# Patient Record
Sex: MALE | Race: WHITE | Employment: OTHER | URBAN - METROPOLITAN AREA
[De-identification: names, ages, dates, MRNs, and addresses within clinical notes are randomized per-mention and may not be internally consistent; named-entity substitution may affect disease eponyms.]

---

## 2023-02-01 RX ORDER — ALBUTEROL SULFATE 90 UG/1
2 AEROSOL, METERED RESPIRATORY (INHALATION)
COMMUNITY

## 2023-02-01 RX ORDER — AMLODIPINE BESYLATE 5 MG/1
5 TABLET ORAL DAILY
COMMUNITY

## 2023-05-23 LAB
ALANINE AMINOTRANSFERASE (SGPT) (U/L) IN SER/PLAS: 11 U/L (ref 10–52)
ALBUMIN (G/DL) IN SER/PLAS: 4.7 G/DL (ref 3.4–5)
ALKALINE PHOSPHATASE (U/L) IN SER/PLAS: 67 U/L (ref 33–136)
ASPARTATE AMINOTRANSFERASE (SGOT) (U/L) IN SER/PLAS: 17 U/L (ref 9–39)
BILIRUBIN DIRECT (MG/DL) IN SER/PLAS: 0.1 MG/DL (ref 0–0.3)
BILIRUBIN TOTAL (MG/DL) IN SER/PLAS: 0.9 MG/DL (ref 0–1.2)
CHOLESTEROL (MG/DL) IN SER/PLAS: 112 MG/DL (ref 0–199)
CHOLESTEROL IN HDL (MG/DL) IN SER/PLAS: 40.6 MG/DL
CHOLESTEROL/HDL RATIO: 2.8
LDL: 52 MG/DL (ref 0–99)
PROTEIN TOTAL: 7.3 G/DL (ref 6.4–8.2)
TRIGLYCERIDE (MG/DL) IN SER/PLAS: 99 MG/DL (ref 0–149)
VLDL: 20 MG/DL (ref 0–40)

## 2023-09-07 PROBLEM — Z91.89 AT RISK FOR BLEEDING: Status: ACTIVE | Noted: 2023-09-07

## 2023-09-07 PROBLEM — J45.909 ASTHMA (HHS-HCC): Status: ACTIVE | Noted: 2023-09-07

## 2023-09-07 PROBLEM — E78.5 HYPERLIPIDEMIA: Status: ACTIVE | Noted: 2023-09-07

## 2023-09-07 PROBLEM — K92.2 LOWER GASTROINTESTINAL HEMORRHAGE: Status: RESOLVED | Noted: 2023-09-07 | Resolved: 2023-09-07

## 2023-09-07 PROBLEM — I10 HYPERTENSION: Status: ACTIVE | Noted: 2023-09-07

## 2023-09-07 PROBLEM — K57.90 DIVERTICULOSIS: Status: ACTIVE | Noted: 2023-09-07

## 2023-09-07 PROBLEM — C61 CARCINOMA OF PROSTATE (MULTI): Status: ACTIVE | Noted: 2023-09-07

## 2023-09-07 PROBLEM — R73.03 PREDIABETES: Status: ACTIVE | Noted: 2023-09-07

## 2023-09-07 PROBLEM — I82.90 VENOUS THROMBOSIS: Status: ACTIVE | Noted: 2023-09-07

## 2023-09-07 PROBLEM — K62.5 RECTAL HEMORRHAGE: Status: RESOLVED | Noted: 2023-09-07 | Resolved: 2023-09-07

## 2023-09-07 PROBLEM — K57.30 DIVERTICULAR DISEASE OF COLON: Status: ACTIVE | Noted: 2023-09-07

## 2023-09-07 PROBLEM — I25.10 CORONARY ARTERIOSCLEROSIS: Status: ACTIVE | Noted: 2023-09-07

## 2023-09-07 PROBLEM — E78.6 LIPOPROTEIN DEFICIENCY DISORDER: Status: ACTIVE | Noted: 2023-09-07

## 2023-09-07 PROBLEM — F40.243 FEAR OF FLYING: Status: ACTIVE | Noted: 2023-09-07

## 2023-09-07 RX ORDER — MAGNESIUM 200 MG
2 TABLET ORAL NIGHTLY
COMMUNITY

## 2023-09-07 RX ORDER — MULTIVIT-MINS/IRON/FOLIC/LYCOP 8-200-600
1 TABLET ORAL DAILY
COMMUNITY

## 2023-09-07 RX ORDER — LORAZEPAM 0.5 MG/1
0.5 TABLET ORAL SEE ADMIN INSTRUCTIONS
COMMUNITY
Start: 2023-03-27 | End: 2023-11-13 | Stop reason: SDUPTHER

## 2023-09-07 RX ORDER — ROSUVASTATIN CALCIUM 20 MG/1
1 TABLET, COATED ORAL DAILY
COMMUNITY
End: 2024-03-01

## 2023-09-07 RX ORDER — UBIDECARENONE 50 MG
2 CAPSULE ORAL DAILY
COMMUNITY
End: 2024-01-29 | Stop reason: ALTCHOICE

## 2023-09-07 RX ORDER — LECITHIN 1200 MG
1600 CAPSULE ORAL DAILY
COMMUNITY

## 2023-09-07 RX ORDER — ALBUTEROL SULFATE 0.83 MG/ML
3 SOLUTION RESPIRATORY (INHALATION) 4 TIMES DAILY
COMMUNITY

## 2023-09-07 RX ORDER — LANOLIN ALCOHOL/MO/W.PET/CERES
1 CREAM (GRAM) TOPICAL DAILY
COMMUNITY
End: 2024-01-29 | Stop reason: ALTCHOICE

## 2023-09-07 RX ORDER — AMLODIPINE BESYLATE 10 MG/1
1 TABLET ORAL DAILY
COMMUNITY
End: 2024-03-15

## 2023-09-07 RX ORDER — TRAMADOL HYDROCHLORIDE 50 MG/1
1 TABLET ORAL EVERY 4 HOURS PRN
COMMUNITY
End: 2024-01-29 | Stop reason: ALTCHOICE

## 2023-09-07 RX ORDER — ASPIRIN 325 MG
1 TABLET, DELAYED RELEASE (ENTERIC COATED) ORAL DAILY
COMMUNITY

## 2023-09-07 RX ORDER — CHOLECALCIFEROL (VITAMIN D3) 125 MCG
1 CAPSULE ORAL DAILY
COMMUNITY
End: 2024-01-29 | Stop reason: ALTCHOICE

## 2023-09-07 RX ORDER — HYOSCYAMINE SULFATE 0.125 MG
1 TABLET ORAL EVERY 4 HOURS PRN
COMMUNITY

## 2023-11-13 ENCOUNTER — TELEPHONE (OUTPATIENT)
Dept: PRIMARY CARE | Facility: CLINIC | Age: 63
End: 2023-11-13
Payer: MEDICARE

## 2023-11-13 DIAGNOSIS — F40.243 FEAR OF FLYING: Primary | ICD-10-CM

## 2023-11-13 RX ORDER — LORAZEPAM 0.5 MG/1
0.25 TABLET ORAL SEE ADMIN INSTRUCTIONS
Qty: 3 TABLET | Refills: 0 | Status: SHIPPED | OUTPATIENT
Start: 2023-11-13 | End: 2023-11-20 | Stop reason: SDUPTHER

## 2023-11-13 NOTE — TELEPHONE ENCOUNTER
Dejuan called to see about getting a refill on his Lorazepam 0.5 mg. He mentioned that he is flying in 2 weeks. He is asking for it to be sent to [facility], [place].

## 2023-11-20 ENCOUNTER — TELEPHONE (OUTPATIENT)
Dept: PRIMARY CARE | Facility: CLINIC | Age: 63
End: 2023-11-20
Payer: MEDICARE

## 2023-11-20 DIAGNOSIS — F40.243 FEAR OF FLYING: ICD-10-CM

## 2023-11-20 RX ORDER — LORAZEPAM 0.5 MG/1
0.5 TABLET ORAL DAILY PRN
Qty: 4 TABLET | Refills: 0 | Status: SHIPPED | OUTPATIENT
Start: 2023-11-20

## 2023-11-20 NOTE — TELEPHONE ENCOUNTER
Patient called very upset and said he has been waiting for this refill for a week. He called on 11/13, 11/17 and today. Please refill today, otherwise he said he will be here in person tomorrow to request a paper RX.

## 2024-01-29 ENCOUNTER — OFFICE VISIT (OUTPATIENT)
Dept: PRIMARY CARE | Facility: CLINIC | Age: 64
End: 2024-01-29
Payer: MEDICARE

## 2024-01-29 VITALS
HEART RATE: 60 BPM | TEMPERATURE: 98.9 F | OXYGEN SATURATION: 96 % | DIASTOLIC BLOOD PRESSURE: 82 MMHG | HEIGHT: 69 IN | SYSTOLIC BLOOD PRESSURE: 132 MMHG | WEIGHT: 200.2 LBS | BODY MASS INDEX: 29.65 KG/M2

## 2024-01-29 DIAGNOSIS — I10 PRIMARY HYPERTENSION: ICD-10-CM

## 2024-01-29 DIAGNOSIS — E78.2 MIXED HYPERLIPIDEMIA: ICD-10-CM

## 2024-01-29 DIAGNOSIS — R73.03 PREDIABETES: ICD-10-CM

## 2024-01-29 DIAGNOSIS — C61 CARCINOMA OF PROSTATE (MULTI): ICD-10-CM

## 2024-01-29 DIAGNOSIS — Z00.00 ROUTINE GENERAL MEDICAL EXAMINATION AT HEALTH CARE FACILITY: Primary | ICD-10-CM

## 2024-01-29 PROBLEM — E78.6 LIPOPROTEIN DEFICIENCY DISORDER: Chronic | Status: ACTIVE | Noted: 2023-09-07

## 2024-01-29 PROBLEM — K57.90 DIVERTICULOSIS: Status: RESOLVED | Noted: 2023-09-07 | Resolved: 2024-01-29

## 2024-01-29 PROBLEM — Z91.89 AT RISK FOR BLEEDING: Status: RESOLVED | Noted: 2023-09-07 | Resolved: 2024-01-29

## 2024-01-29 PROBLEM — F40.243 FEAR OF FLYING: Chronic | Status: ACTIVE | Noted: 2023-09-07

## 2024-01-29 PROBLEM — E78.5 HYPERLIPIDEMIA: Chronic | Status: ACTIVE | Noted: 2023-09-07

## 2024-01-29 PROCEDURE — 99215 OFFICE O/P EST HI 40 MIN: CPT | Performed by: FAMILY MEDICINE

## 2024-01-29 PROCEDURE — 99214 OFFICE O/P EST MOD 30 MIN: CPT | Performed by: FAMILY MEDICINE

## 2024-01-29 PROCEDURE — 1170F FXNL STATUS ASSESSED: CPT | Performed by: FAMILY MEDICINE

## 2024-01-29 PROCEDURE — 1160F RVW MEDS BY RX/DR IN RCRD: CPT | Performed by: FAMILY MEDICINE

## 2024-01-29 PROCEDURE — 3075F SYST BP GE 130 - 139MM HG: CPT | Performed by: FAMILY MEDICINE

## 2024-01-29 PROCEDURE — 1159F MED LIST DOCD IN RCRD: CPT | Performed by: FAMILY MEDICINE

## 2024-01-29 PROCEDURE — 3079F DIAST BP 80-89 MM HG: CPT | Performed by: FAMILY MEDICINE

## 2024-01-29 PROCEDURE — 1036F TOBACCO NON-USER: CPT | Performed by: FAMILY MEDICINE

## 2024-01-29 PROCEDURE — G0439 PPPS, SUBSEQ VISIT: HCPCS | Performed by: FAMILY MEDICINE

## 2024-01-29 PROCEDURE — 1126F AMNT PAIN NOTED NONE PRSNT: CPT | Performed by: FAMILY MEDICINE

## 2024-01-29 ASSESSMENT — ENCOUNTER SYMPTOMS
DEPRESSION: 0
CONSTITUTIONAL NEGATIVE: 1
ENDOCRINE NEGATIVE: 1
ALLERGIC/IMMUNOLOGIC NEGATIVE: 1
RESPIRATORY NEGATIVE: 1
OCCASIONAL FEELINGS OF UNSTEADINESS: 0
MUSCULOSKELETAL NEGATIVE: 1
PSYCHIATRIC NEGATIVE: 1
LOSS OF SENSATION IN FEET: 0
GASTROINTESTINAL NEGATIVE: 1
EYES NEGATIVE: 1
NEUROLOGICAL NEGATIVE: 1

## 2024-01-29 ASSESSMENT — LIFESTYLE VARIABLES
AUDIT-C TOTAL SCORE: 3
HOW MANY STANDARD DRINKS CONTAINING ALCOHOL DO YOU HAVE ON A TYPICAL DAY: 1 OR 2
SKIP TO QUESTIONS 9-10: 1
HOW OFTEN DO YOU HAVE A DRINK CONTAINING ALCOHOL: 2-3 TIMES A WEEK
HOW OFTEN DO YOU HAVE SIX OR MORE DRINKS ON ONE OCCASION: NEVER

## 2024-01-29 ASSESSMENT — PAIN SCALES - GENERAL: PAINLEVEL: 0-NO PAIN

## 2024-01-29 ASSESSMENT — ACTIVITIES OF DAILY LIVING (ADL)
JUDGMENT_ADEQUATE_SAFELY_COMPLETE_DAILY_ACTIVITIES: YES
GROCERY_SHOPPING: INDEPENDENT
ADEQUATE_TO_COMPLETE_ADL: YES
MANAGING_FINANCES: INDEPENDENT
DRESSING: INDEPENDENT
BATHING: INDEPENDENT
DOING_HOUSEWORK: INDEPENDENT
FEEDING: INDEPENDENT
TAKING_MEDICATION: INDEPENDENT
DRESSING: INDEPENDENT
TOILETING: INDEPENDENT
BATHING: INDEPENDENT

## 2024-01-29 ASSESSMENT — PATIENT HEALTH QUESTIONNAIRE - PHQ9
2. FEELING DOWN, DEPRESSED OR HOPELESS: NOT AT ALL
SUM OF ALL RESPONSES TO PHQ9 QUESTIONS 1 AND 2: 0
1. LITTLE INTEREST OR PLEASURE IN DOING THINGS: NOT AT ALL

## 2024-01-29 NOTE — PROGRESS NOTES
Subjective   Reason for Visit: Rainer Fajardo is an 63 y.o. male here for a Medicare Wellness visit.     Past Medical, Surgical, and Family History reviewed and updated in chart.    Reviewed all medications by prescribing practitioner or clinical pharmacist (such as prescriptions, OTCs, herbal therapies and supplements) and documented in the medical record.    HPI  Hypertension:          c/o Patient here for F/U. at goal, stable.          c/o Med compliance.          Denies : Med side effects.   Asthma:          c/o Triggers with allergies.          Denies : Shortness of breath.          Denies : Cough.          Asthma F/U doing well and without complaints, at patient's baseline, stable off meds, uses proair prn.   Prostate Cancer:          c/o Difficulty voiding some leakage with urine, sees allyson,  salvage radiation due to rising psa , psa undectable,  sees radiation onc for psa checks          c/o Erectile dysfunction occ.          Denies : Pain.          Denies : Appetite change.          Denies : Fatigue.          Denies : Bone pain.   Diabetes Mellitus:          Insulin Resistance diagnosed with pre diabetes, follow fasting blood sugars periodically, counseled on weight loss and regular exercise.   Hyperlipidemia:          c/o Patient here for F/U. stable, currently trying to control with therapeutic lifestyle changes, on rosuvastatin   Patient Care Team:  Ed Pressley MD as PCP - General (Family Medicine)  Damon Caruso MD as Referring Physician (Urology)  Jacob Perez MD as Consulting Physician (Radiation Oncology)     Review of Systems   Constitutional: Negative.    HENT: Negative.     Eyes: Negative.    Respiratory: Negative.     Gastrointestinal: Negative.    Endocrine: Negative.    Genitourinary: Negative.    Musculoskeletal: Negative.    Skin: Negative.    Allergic/Immunologic: Negative.    Neurological: Negative.    Psychiatric/Behavioral: Negative.         Objective   Vitals:  BP  "132/82   Pulse 60   Temp 37.2 °C (98.9 °F) (Temporal)   Ht 1.74 m (5' 8.5\")   Wt 90.8 kg (200 lb 3.2 oz)   SpO2 96%   BMI 30.00 kg/m²       Physical Exam  Vitals reviewed.   Constitutional:       Appearance: Normal appearance.   HENT:      Right Ear: Tympanic membrane, ear canal and external ear normal.      Left Ear: Tympanic membrane, ear canal and external ear normal.      Nose: Nose normal.      Mouth/Throat:      Mouth: Mucous membranes are moist.   Eyes:      Extraocular Movements: Extraocular movements intact.      Conjunctiva/sclera: Conjunctivae normal.      Pupils: Pupils are equal, round, and reactive to light.   Cardiovascular:      Rate and Rhythm: Normal rate and regular rhythm.      Pulses: Normal pulses.      Heart sounds: Normal heart sounds.   Pulmonary:      Breath sounds: Normal breath sounds.   Abdominal:      General: Abdomen is flat. Bowel sounds are normal.      Palpations: Abdomen is soft.   Musculoskeletal:         General: Normal range of motion.      Cervical back: Neck supple.   Skin:     General: Skin is warm.   Neurological:      General: No focal deficit present.      Mental Status: He is alert and oriented to person, place, and time.      Cranial Nerves: Cranial nerves 2-12 are intact.   Psychiatric:         Attention and Perception: Attention normal.         Mood and Affect: Mood normal.         Assessment/Plan   Problem List Items Addressed This Visit       Carcinoma of prostate (CMS/HCC)    Hyperlipidemia    Relevant Orders    Urinalysis with Reflex Culture and Microscopic    Comprehensive Metabolic Panel    CBC and Auto Differential    TSH with reflex to Free T4 if abnormal    Lipid Panel    Hypertension    Relevant Orders    Urinalysis with Reflex Culture and Microscopic    Comprehensive Metabolic Panel    CBC and Auto Differential    TSH with reflex to Free T4 if abnormal    Lipid Panel    Prediabetes    Relevant Orders    Hemoglobin A1C     Other Visit Diagnoses       " Routine general medical examination at health care facility    -  Primary    Relevant Orders    1 Year Follow Up In Primary Care - Wellness Exam          Rto 6mo  Cont meds

## 2024-02-05 ENCOUNTER — LAB (OUTPATIENT)
Dept: LAB | Facility: LAB | Age: 64
End: 2024-02-05
Payer: MEDICARE

## 2024-02-05 DIAGNOSIS — E78.2 MIXED HYPERLIPIDEMIA: ICD-10-CM

## 2024-02-05 DIAGNOSIS — R73.03 PREDIABETES: ICD-10-CM

## 2024-02-05 DIAGNOSIS — I10 PRIMARY HYPERTENSION: ICD-10-CM

## 2024-02-05 LAB
ALBUMIN SERPL BCP-MCNC: 4.6 G/DL (ref 3.4–5)
ALP SERPL-CCNC: 64 U/L (ref 33–136)
ALT SERPL W P-5'-P-CCNC: 14 U/L (ref 10–52)
ANION GAP SERPL CALC-SCNC: 13 MMOL/L (ref 10–20)
APPEARANCE UR: CLEAR
AST SERPL W P-5'-P-CCNC: 18 U/L (ref 9–39)
BASOPHILS # BLD AUTO: 0.08 X10*3/UL (ref 0–0.1)
BASOPHILS NFR BLD AUTO: 1.3 %
BILIRUB SERPL-MCNC: 0.8 MG/DL (ref 0–1.2)
BILIRUB UR STRIP.AUTO-MCNC: NEGATIVE MG/DL
BUN SERPL-MCNC: 16 MG/DL (ref 6–23)
CALCIUM SERPL-MCNC: 9.6 MG/DL (ref 8.6–10.3)
CHLORIDE SERPL-SCNC: 105 MMOL/L (ref 98–107)
CHOLEST SERPL-MCNC: 121 MG/DL (ref 0–199)
CHOLESTEROL/HDL RATIO: 2.9
CO2 SERPL-SCNC: 27 MMOL/L (ref 21–32)
COLOR UR: YELLOW
CREAT SERPL-MCNC: 0.99 MG/DL (ref 0.5–1.3)
EGFRCR SERPLBLD CKD-EPI 2021: 85 ML/MIN/1.73M*2
EOSINOPHIL # BLD AUTO: 0.15 X10*3/UL (ref 0–0.7)
EOSINOPHIL NFR BLD AUTO: 2.5 %
ERYTHROCYTE [DISTWIDTH] IN BLOOD BY AUTOMATED COUNT: 12 % (ref 11.5–14.5)
EST. AVERAGE GLUCOSE BLD GHB EST-MCNC: 108 MG/DL
GLUCOSE SERPL-MCNC: 95 MG/DL (ref 74–99)
GLUCOSE UR STRIP.AUTO-MCNC: NEGATIVE MG/DL
HBA1C MFR BLD: 5.4 %
HCT VFR BLD AUTO: 46.6 % (ref 41–52)
HDLC SERPL-MCNC: 42.4 MG/DL
HGB BLD-MCNC: 15.6 G/DL (ref 13.5–17.5)
HOLD SPECIMEN: NORMAL
IMM GRANULOCYTES # BLD AUTO: 0.03 X10*3/UL (ref 0–0.7)
IMM GRANULOCYTES NFR BLD AUTO: 0.5 % (ref 0–0.9)
KETONES UR STRIP.AUTO-MCNC: NEGATIVE MG/DL
LDLC SERPL CALC-MCNC: 59 MG/DL
LEUKOCYTE ESTERASE UR QL STRIP.AUTO: NEGATIVE
LYMPHOCYTES # BLD AUTO: 1.49 X10*3/UL (ref 1.2–4.8)
LYMPHOCYTES NFR BLD AUTO: 24.5 %
MCH RBC QN AUTO: 31.9 PG (ref 26–34)
MCHC RBC AUTO-ENTMCNC: 33.5 G/DL (ref 32–36)
MCV RBC AUTO: 95 FL (ref 80–100)
MONOCYTES # BLD AUTO: 0.48 X10*3/UL (ref 0.1–1)
MONOCYTES NFR BLD AUTO: 7.9 %
NEUTROPHILS # BLD AUTO: 3.85 X10*3/UL (ref 1.2–7.7)
NEUTROPHILS NFR BLD AUTO: 63.3 %
NITRITE UR QL STRIP.AUTO: NEGATIVE
NON HDL CHOLESTEROL: 79 MG/DL (ref 0–149)
NRBC BLD-RTO: 0 /100 WBCS (ref 0–0)
PH UR STRIP.AUTO: 5 [PH]
PLATELET # BLD AUTO: 181 X10*3/UL (ref 150–450)
POTASSIUM SERPL-SCNC: 4.5 MMOL/L (ref 3.5–5.3)
PROT SERPL-MCNC: 7.2 G/DL (ref 6.4–8.2)
PROT UR STRIP.AUTO-MCNC: NEGATIVE MG/DL
RBC # BLD AUTO: 4.89 X10*6/UL (ref 4.5–5.9)
RBC # UR STRIP.AUTO: NEGATIVE /UL
SODIUM SERPL-SCNC: 140 MMOL/L (ref 136–145)
SP GR UR STRIP.AUTO: 1.02
TRIGL SERPL-MCNC: 98 MG/DL (ref 0–149)
TSH SERPL-ACNC: 1.87 MIU/L (ref 0.44–3.98)
UROBILINOGEN UR STRIP.AUTO-MCNC: <2 MG/DL
VLDL: 20 MG/DL (ref 0–40)
WBC # BLD AUTO: 6.1 X10*3/UL (ref 4.4–11.3)

## 2024-02-05 PROCEDURE — 80053 COMPREHEN METABOLIC PANEL: CPT

## 2024-02-05 PROCEDURE — 36415 COLL VENOUS BLD VENIPUNCTURE: CPT

## 2024-02-05 PROCEDURE — 80061 LIPID PANEL: CPT

## 2024-02-05 PROCEDURE — 85025 COMPLETE CBC W/AUTO DIFF WBC: CPT

## 2024-02-05 PROCEDURE — 83036 HEMOGLOBIN GLYCOSYLATED A1C: CPT

## 2024-02-05 PROCEDURE — 81003 URINALYSIS AUTO W/O SCOPE: CPT

## 2024-02-05 PROCEDURE — 84443 ASSAY THYROID STIM HORMONE: CPT

## 2024-02-16 ENCOUNTER — LAB (OUTPATIENT)
Dept: LAB | Facility: CLINIC | Age: 64
End: 2024-02-16
Payer: MEDICARE

## 2024-02-16 DIAGNOSIS — C61 CARCINOMA OF PROSTATE (MULTI): Chronic | ICD-10-CM

## 2024-02-16 LAB — PSA SERPL-MCNC: <0.1 NG/ML

## 2024-02-16 PROCEDURE — 36415 COLL VENOUS BLD VENIPUNCTURE: CPT

## 2024-02-16 PROCEDURE — 84153 ASSAY OF PSA TOTAL: CPT | Mod: WESLAB | Performed by: RADIOLOGY

## 2024-03-01 ENCOUNTER — TELEPHONE (OUTPATIENT)
Dept: RADIATION ONCOLOGY | Facility: HOSPITAL | Age: 64
End: 2024-03-01
Payer: MEDICARE

## 2024-03-01 DIAGNOSIS — E78.2 MIXED HYPERLIPIDEMIA: Primary | Chronic | ICD-10-CM

## 2024-03-01 RX ORDER — ROSUVASTATIN CALCIUM 20 MG/1
20 TABLET, COATED ORAL DAILY
Qty: 90 TABLET | Refills: 3 | Status: SHIPPED | OUTPATIENT
Start: 2024-03-01

## 2024-03-15 DIAGNOSIS — I10 ESSENTIAL (PRIMARY) HYPERTENSION: ICD-10-CM

## 2024-03-15 RX ORDER — AMLODIPINE BESYLATE 10 MG/1
10 TABLET ORAL DAILY
Qty: 90 TABLET | Refills: 1 | Status: SHIPPED | OUTPATIENT
Start: 2024-03-15

## 2024-03-25 ENCOUNTER — TELEPHONE (OUTPATIENT)
Dept: PRIMARY CARE | Facility: CLINIC | Age: 64
End: 2024-03-25

## 2024-03-25 NOTE — TELEPHONE ENCOUNTER
Patient's wife calling because patient's 01/29/24 visit was coded as a CPE but should have been coded as a medicare wellness, needs to be changed and resubmitted to medicare so they will approve the claim

## 2024-07-30 ENCOUNTER — OFFICE VISIT (OUTPATIENT)
Dept: PRIMARY CARE | Facility: CLINIC | Age: 64
End: 2024-07-30
Payer: MEDICARE

## 2024-07-30 VITALS
OXYGEN SATURATION: 98 % | HEART RATE: 59 BPM | BODY MASS INDEX: 29.8 KG/M2 | WEIGHT: 201.2 LBS | TEMPERATURE: 98.6 F | SYSTOLIC BLOOD PRESSURE: 138 MMHG | DIASTOLIC BLOOD PRESSURE: 79 MMHG | HEIGHT: 69 IN

## 2024-07-30 DIAGNOSIS — C61 CARCINOMA OF PROSTATE (MULTI): Chronic | ICD-10-CM

## 2024-07-30 DIAGNOSIS — I10 PRIMARY HYPERTENSION: Primary | Chronic | ICD-10-CM

## 2024-07-30 DIAGNOSIS — J45.30 MILD PERSISTENT ASTHMA WITHOUT COMPLICATION (HHS-HCC): ICD-10-CM

## 2024-07-30 DIAGNOSIS — E78.2 MIXED HYPERLIPIDEMIA: Chronic | ICD-10-CM

## 2024-07-30 DIAGNOSIS — R73.03 PREDIABETES: Chronic | ICD-10-CM

## 2024-07-30 PROCEDURE — 3008F BODY MASS INDEX DOCD: CPT | Performed by: FAMILY MEDICINE

## 2024-07-30 PROCEDURE — 1124F ACP DISCUSS-NO DSCNMKR DOCD: CPT | Performed by: FAMILY MEDICINE

## 2024-07-30 PROCEDURE — 1126F AMNT PAIN NOTED NONE PRSNT: CPT | Performed by: FAMILY MEDICINE

## 2024-07-30 PROCEDURE — 1036F TOBACCO NON-USER: CPT | Performed by: FAMILY MEDICINE

## 2024-07-30 PROCEDURE — G2211 COMPLEX E/M VISIT ADD ON: HCPCS | Performed by: FAMILY MEDICINE

## 2024-07-30 PROCEDURE — 3075F SYST BP GE 130 - 139MM HG: CPT | Performed by: FAMILY MEDICINE

## 2024-07-30 PROCEDURE — 99214 OFFICE O/P EST MOD 30 MIN: CPT | Performed by: FAMILY MEDICINE

## 2024-07-30 PROCEDURE — 3078F DIAST BP <80 MM HG: CPT | Performed by: FAMILY MEDICINE

## 2024-07-30 PROCEDURE — 1160F RVW MEDS BY RX/DR IN RCRD: CPT | Performed by: FAMILY MEDICINE

## 2024-07-30 PROCEDURE — 1159F MED LIST DOCD IN RCRD: CPT | Performed by: FAMILY MEDICINE

## 2024-07-30 RX ORDER — EPINEPHRINE 0.22MG
100 AEROSOL WITH ADAPTER (ML) INHALATION DAILY
COMMUNITY

## 2024-07-30 ASSESSMENT — COLUMBIA-SUICIDE SEVERITY RATING SCALE - C-SSRS
1. IN THE PAST MONTH, HAVE YOU WISHED YOU WERE DEAD OR WISHED YOU COULD GO TO SLEEP AND NOT WAKE UP?: NO
2. HAVE YOU ACTUALLY HAD ANY THOUGHTS OF KILLING YOURSELF?: NO
6. HAVE YOU EVER DONE ANYTHING, STARTED TO DO ANYTHING, OR PREPARED TO DO ANYTHING TO END YOUR LIFE?: NO

## 2024-07-30 ASSESSMENT — PAIN SCALES - GENERAL: PAINLEVEL: 0-NO PAIN

## 2024-07-30 ASSESSMENT — ENCOUNTER SYMPTOMS
COUGH: 0
SHORTNESS OF BREATH: 0
OCCASIONAL FEELINGS OF UNSTEADINESS: 0
PALPITATIONS: 0
ABDOMINAL DISTENTION: 0
LOSS OF SENSATION IN FEET: 0
DEPRESSION: 0

## 2024-07-30 NOTE — PROGRESS NOTES
"Subjective   Patient ID: Rainer Fajardo is a 64 y.o. male who presents for Hypertension (Follow up) and blood work orders.    HPI   Hypertension:          c/o Patient here for F/U. at goal, stable.          c/o Med compliance.          Denies : Med side effects.   Home bp 130's/70's  Asthma:          c/o Triggers with allergies.          Denies : Shortness of breath.          Denies : Cough.          Asthma F/U doing well and without complaints, at patient's baseline, stable off meds, uses proair prn.   Prostate Cancer:          c/o Difficulty voiding some leakage with urine, sees allyson,  salvage radiation due to rising psa , psa undectable,  sees radiation onc for psa checks          c/o Erectile dysfunction occ.          Denies : Pain.          Denies : Appetite change.          Denies : Fatigue.          Denies : Bone pain.   Diabetes Mellitus:          Insulin Resistance diagnosed with pre diabetes, follow fasting blood sugars periodically, counseled on weight loss and regular exercise.   Hyperlipidemia:          c/o Patient here for F/U. stable,  on rosuvastatin   Review of Systems   Respiratory:  Negative for cough and shortness of breath.    Cardiovascular:  Negative for chest pain and palpitations.   Gastrointestinal:  Negative for abdominal distention.       Objective   /79   Pulse 59   Temp 37 °C (98.6 °F)   Ht 1.753 m (5' 9\")   Wt 91.3 kg (201 lb 3.2 oz)   SpO2 98%   BMI 29.71 kg/m²     Physical Exam  Vitals reviewed.   Constitutional:       Appearance: Normal appearance.   Cardiovascular:      Rate and Rhythm: Normal rate and regular rhythm.      Heart sounds: Normal heart sounds. No murmur heard.  Pulmonary:      Breath sounds: Normal breath sounds.   Abdominal:      General: Abdomen is flat. Bowel sounds are normal.      Palpations: Abdomen is soft.   Musculoskeletal:         General: No swelling.   Neurological:      Mental Status: He is alert.         Assessment/Plan   Diagnoses and " all orders for this visit:  Primary hypertension  -     Comprehensive Metabolic Panel; Future  Mixed hyperlipidemia  Prediabetes  Carcinoma of prostate (Multi)  Mild persistent asthma without complication (HHS-HCC)  Other orders  -     3 Month Follow Up In Primary Care

## 2024-08-08 ENCOUNTER — LAB (OUTPATIENT)
Dept: LAB | Facility: LAB | Age: 64
End: 2024-08-08
Payer: MEDICARE

## 2024-08-08 DIAGNOSIS — I10 PRIMARY HYPERTENSION: Chronic | ICD-10-CM

## 2024-08-08 LAB
ALBUMIN SERPL BCP-MCNC: 5 G/DL (ref 3.4–5)
ALP SERPL-CCNC: 65 U/L (ref 33–136)
ALT SERPL W P-5'-P-CCNC: 14 U/L (ref 10–52)
ANION GAP SERPL CALC-SCNC: 13 MMOL/L (ref 10–20)
AST SERPL W P-5'-P-CCNC: 21 U/L (ref 9–39)
BILIRUB SERPL-MCNC: 0.6 MG/DL (ref 0–1.2)
BUN SERPL-MCNC: 19 MG/DL (ref 6–23)
CALCIUM SERPL-MCNC: 9.6 MG/DL (ref 8.6–10.3)
CHLORIDE SERPL-SCNC: 105 MMOL/L (ref 98–107)
CO2 SERPL-SCNC: 26 MMOL/L (ref 21–32)
CREAT SERPL-MCNC: 0.93 MG/DL (ref 0.5–1.3)
EGFRCR SERPLBLD CKD-EPI 2021: >90 ML/MIN/1.73M*2
GLUCOSE SERPL-MCNC: 99 MG/DL (ref 74–99)
POTASSIUM SERPL-SCNC: 4.4 MMOL/L (ref 3.5–5.3)
PROT SERPL-MCNC: 7.3 G/DL (ref 6.4–8.2)
SODIUM SERPL-SCNC: 140 MMOL/L (ref 136–145)

## 2024-08-08 PROCEDURE — 80053 COMPREHEN METABOLIC PANEL: CPT

## 2024-08-08 PROCEDURE — 36415 COLL VENOUS BLD VENIPUNCTURE: CPT

## 2024-08-13 ENCOUNTER — LAB (OUTPATIENT)
Dept: LAB | Facility: CLINIC | Age: 64
End: 2024-08-13
Payer: MEDICARE

## 2024-08-13 DIAGNOSIS — C61 CARCINOMA OF PROSTATE (MULTI): Chronic | ICD-10-CM

## 2024-08-13 LAB — PSA SERPL-MCNC: 0.13 NG/ML

## 2024-08-13 PROCEDURE — 84153 ASSAY OF PSA TOTAL: CPT | Performed by: RADIOLOGY

## 2024-08-13 PROCEDURE — 36415 COLL VENOUS BLD VENIPUNCTURE: CPT

## 2024-08-19 ENCOUNTER — APPOINTMENT (OUTPATIENT)
Dept: RADIATION ONCOLOGY | Facility: CLINIC | Age: 64
End: 2024-08-19
Payer: MEDICARE

## 2024-09-07 DIAGNOSIS — I10 ESSENTIAL (PRIMARY) HYPERTENSION: ICD-10-CM

## 2024-09-08 RX ORDER — AMLODIPINE BESYLATE 10 MG/1
10 TABLET ORAL DAILY
Qty: 90 TABLET | Refills: 1 | Status: SHIPPED | OUTPATIENT
Start: 2024-09-08

## 2024-09-09 ENCOUNTER — HOSPITAL ENCOUNTER (OUTPATIENT)
Dept: RADIATION ONCOLOGY | Facility: CLINIC | Age: 64
Setting detail: RADIATION/ONCOLOGY SERIES
Discharge: HOME | End: 2024-09-09
Payer: MEDICARE

## 2024-09-09 VITALS
TEMPERATURE: 97.7 F | OXYGEN SATURATION: 96 % | BODY MASS INDEX: 29.72 KG/M2 | WEIGHT: 201.28 LBS | HEART RATE: 70 BPM | SYSTOLIC BLOOD PRESSURE: 151 MMHG | DIASTOLIC BLOOD PRESSURE: 80 MMHG | RESPIRATION RATE: 18 BRPM

## 2024-09-09 DIAGNOSIS — C61 CARCINOMA OF PROSTATE (MULTI): Chronic | ICD-10-CM

## 2024-09-09 PROCEDURE — 99213 OFFICE O/P EST LOW 20 MIN: CPT | Performed by: RADIOLOGY

## 2024-09-09 ASSESSMENT — ENCOUNTER SYMPTOMS
OCCASIONAL FEELINGS OF UNSTEADINESS: 0
LOSS OF SENSATION IN FEET: 0
DEPRESSION: 0

## 2024-09-09 ASSESSMENT — COLUMBIA-SUICIDE SEVERITY RATING SCALE - C-SSRS
6. HAVE YOU EVER DONE ANYTHING, STARTED TO DO ANYTHING, OR PREPARED TO DO ANYTHING TO END YOUR LIFE?: NO
2. HAVE YOU ACTUALLY HAD ANY THOUGHTS OF KILLING YOURSELF?: NO
1. IN THE PAST MONTH, HAVE YOU WISHED YOU WERE DEAD OR WISHED YOU COULD GO TO SLEEP AND NOT WAKE UP?: NO

## 2024-09-09 ASSESSMENT — PAIN SCALES - GENERAL: PAINLEVEL: 0-NO PAIN

## 2024-09-09 ASSESSMENT — PATIENT HEALTH QUESTIONNAIRE - PHQ9
2. FEELING DOWN, DEPRESSED OR HOPELESS: NOT AT ALL
1. LITTLE INTEREST OR PLEASURE IN DOING THINGS: NOT AT ALL
SUM OF ALL RESPONSES TO PHQ9 QUESTIONS 1 AND 2: 0

## 2024-09-09 NOTE — PROGRESS NOTES
Radiation Oncology Follow-Up    Patient Name:  Rainer Fajardo  MRN:  [id number]  :  1960    Referring Provider: No ref. provider found  Primary Care Provider: Ed Pressley MD  Care Team: Patient Care Team:  Ed Pressley MD as PCP - General (Family Medicine)  Miquel Sanabria MD as Radiation Oncologist (Radiation Oncology)    Date of Service: 2024    SUBJECTIVE  History of Present Illness:  Rainer Fajardo is a 64 y.o. male who was previously seen at the [facility] Department of Radiation Oncology for prostate ca.      DIAGNOSIS: Initially Stage IIIC cT2 N0 M0 GG4 PSA 4.1. Dx 2018. Bone scan negative for mets. 2019 RP pT3b N0 M0 GG5 R0, undetectable PSA post op. PSA rising to 0.53 on 2021. IBF 2 years. Doubling time 1 year.      REGION TREATED: prostate bed     Treatment Area #1  Location : prostate bed  Dates : -22  Number of Treatments : 34  Dose : 68 Gy  Modality : IMRT     INTERVAL SINCE COMPLETION OF RADIATION:  2 years 3 months.      TUMOR STATUS:   Rising PSA     CHIEF COMPLAINT:   Presents for follow-up recommendations regarding cancer listed above.      HISTORY & NARRATIVE:  Rainer returns for routine follow up. He feels well. PSA 0.13. IPSS 3. YING 18. He has no symptoms. No rectal sx.    Review of Systems:   Review of Systems - Oncology    Performance Status:   The Karnofsky performance scale today is 100, Fully active, able to carry on all pre-disease performed without restriction (ECOG equivalent 0).  General: Negative for weight changes, night sweats, fever, recent trauma.   Eyes: Negative for blurriness, eye pain, blind spots.   Ears, nose, mouth, throat: Negative for changes in hearing, changes in taste, sore throat, mouth sores.  Cardiovascular: Negative for palpitations, chest pain, tightness.   Pulmonary: Negative for shortness of breath, cough, sputum.   Gastrointestinal: Negative for indigestion, diarrhea,  constipation, abdominal pain, blood in stool, nausea/vomiting.   Genitourinary: Per HPI.   Neuro: Negative for numbness, tingling, weakness, changes in speech.   Musculoskeletal: Negative for muscle pain, joint pain, edema, tenderness.   Endocrine: Negative for hot flashes.   Other: All others negative.       OBJECTIVE  Vital Signs:  /80 (BP Location: Left arm, Patient Position: Sitting, BP Cuff Size: Adult long)   Pulse 70   Temp 36.5 °C (97.7 °F) (Temporal)   Resp 18   Wt 91.3 kg (201 lb 4.5 oz)   SpO2 96%   BMI 29.72 kg/m²   Physical Exam   General: The patient appears well and is in no acute distress.   Cognition: Acceptable understanding of the essential elements of the medical situation.   Speech: Fluent and coherent.   Eyes: No conjunctival infection. Anicteric. Extraocular movements intact.   Head, ears, nose, mouth, throat: Atraumatic. Moist mucous membranes. Trachea midline.   Lymphatic: No visible cervical lymphadenopathy.   Cardiovascular: No visible jugular venous distention. Skin perfused.   Pulmonary: Breathes comfortably on room air without stridor or cough.   Abdomen: Nondistended.   Extremities: No edema or muscle wasting.   Neurologic: Cranial nerves II-XII are grossly intact. Moves all extremities. No gross focal deficits.   Psychiatric: Mood is appropriate.   Skin: No visible rash or ulcers.     Lab Results   Component Value Date    PSA 0.13 08/13/2024    PSA <0.10 02/16/2024    PSA <0.10 08/09/2023         ASSESSMENT:   Rainer Fajardo is a 64 y.o. male with rising PSA after salvage RT.    PLAN:   He likely has recurrent prostate cancer somewhere in the body, most likely a pelvic or abdominal LN, less likely a bone metastasis. His PSA is too low to evaluate with imaging like PSMA PET or MRI. I suggested to him that we wait for PSA to reach a threshold of ~0.2 - 0.8 and then consider imaging. It may take months - years for a PSA to reach that stage. I am ordering q 4 m PSAs, and he  will be able to check these online and contact us anytime. We will tentatively plan to see him in 12 months or sooner if PSA rises dramatically. His risk of death is still higher of competing causes vs prostate ca, and if if he were to have oligorecurrence, it could likely be salvaged with SBRT. Thus, I told him to not be overly concerned with the results.    Miquel Sanabria MD MS  Department of Radiation Oncology

## 2024-09-09 NOTE — PROGRESS NOTES
Patient seen for follow up after 34fx to prostate ended 6.7.22. YING 18, IPSS 3 for frequency and nocturia 2x/night. Patient also reports some incontinence when sneezing/coughing, overall happy with quality of life. Per Dr. Sanabria patient will have PSA checked in 4 months and follow up with this office in 1 year or sooner as needed. Patient verbalizes understanding with verbal teach back.

## 2025-02-04 ENCOUNTER — OFFICE VISIT (OUTPATIENT)
Dept: PRIMARY CARE | Facility: CLINIC | Age: 65
End: 2025-02-04
Payer: MEDICARE

## 2025-02-04 VITALS
BODY MASS INDEX: 29.77 KG/M2 | HEIGHT: 69 IN | TEMPERATURE: 99.1 F | DIASTOLIC BLOOD PRESSURE: 81 MMHG | WEIGHT: 201 LBS | OXYGEN SATURATION: 98 % | SYSTOLIC BLOOD PRESSURE: 138 MMHG | HEART RATE: 53 BPM

## 2025-02-04 DIAGNOSIS — Z00.00 ROUTINE GENERAL MEDICAL EXAMINATION AT HEALTH CARE FACILITY: Primary | ICD-10-CM

## 2025-02-04 DIAGNOSIS — C61 CARCINOMA OF PROSTATE (MULTI): Chronic | ICD-10-CM

## 2025-02-04 DIAGNOSIS — J45.20 MILD INTERMITTENT ASTHMA WITHOUT COMPLICATION (HHS-HCC): Chronic | ICD-10-CM

## 2025-02-04 DIAGNOSIS — I10 PRIMARY HYPERTENSION: Chronic | ICD-10-CM

## 2025-02-04 DIAGNOSIS — R73.03 PREDIABETES: Chronic | ICD-10-CM

## 2025-02-04 DIAGNOSIS — E78.2 MIXED HYPERLIPIDEMIA: Chronic | ICD-10-CM

## 2025-02-04 PROCEDURE — 1160F RVW MEDS BY RX/DR IN RCRD: CPT | Performed by: FAMILY MEDICINE

## 2025-02-04 PROCEDURE — 3075F SYST BP GE 130 - 139MM HG: CPT | Performed by: FAMILY MEDICINE

## 2025-02-04 PROCEDURE — 99214 OFFICE O/P EST MOD 30 MIN: CPT | Performed by: FAMILY MEDICINE

## 2025-02-04 PROCEDURE — 1036F TOBACCO NON-USER: CPT | Performed by: FAMILY MEDICINE

## 2025-02-04 PROCEDURE — 3008F BODY MASS INDEX DOCD: CPT | Performed by: FAMILY MEDICINE

## 2025-02-04 PROCEDURE — 1126F AMNT PAIN NOTED NONE PRSNT: CPT | Performed by: FAMILY MEDICINE

## 2025-02-04 PROCEDURE — G2211 COMPLEX E/M VISIT ADD ON: HCPCS | Performed by: FAMILY MEDICINE

## 2025-02-04 PROCEDURE — 1159F MED LIST DOCD IN RCRD: CPT | Performed by: FAMILY MEDICINE

## 2025-02-04 PROCEDURE — G0439 PPPS, SUBSEQ VISIT: HCPCS | Performed by: FAMILY MEDICINE

## 2025-02-04 PROCEDURE — 99215 OFFICE O/P EST HI 40 MIN: CPT | Mod: 25 | Performed by: FAMILY MEDICINE

## 2025-02-04 PROCEDURE — 1124F ACP DISCUSS-NO DSCNMKR DOCD: CPT | Performed by: FAMILY MEDICINE

## 2025-02-04 PROCEDURE — 3079F DIAST BP 80-89 MM HG: CPT | Performed by: FAMILY MEDICINE

## 2025-02-04 PROCEDURE — 1170F FXNL STATUS ASSESSED: CPT | Performed by: FAMILY MEDICINE

## 2025-02-04 RX ORDER — ALBUTEROL SULFATE 90 UG/1
2 INHALANT RESPIRATORY (INHALATION) EVERY 4 HOURS PRN
Qty: 8.5 G | Refills: 5 | Status: SHIPPED | OUTPATIENT
Start: 2025-02-04 | End: 2026-02-04

## 2025-02-04 ASSESSMENT — ENCOUNTER SYMPTOMS
MUSCULOSKELETAL NEGATIVE: 1
OCCASIONAL FEELINGS OF UNSTEADINESS: 0
EYES NEGATIVE: 1
LOSS OF SENSATION IN FEET: 0
PSYCHIATRIC NEGATIVE: 1
GASTROINTESTINAL NEGATIVE: 1
RESPIRATORY NEGATIVE: 1
DEPRESSION: 0
ALLERGIC/IMMUNOLOGIC NEGATIVE: 1
NEUROLOGICAL NEGATIVE: 1
ENDOCRINE NEGATIVE: 1
CONSTITUTIONAL NEGATIVE: 1

## 2025-02-04 ASSESSMENT — PATIENT HEALTH QUESTIONNAIRE - PHQ9
1. LITTLE INTEREST OR PLEASURE IN DOING THINGS: NOT AT ALL
2. FEELING DOWN, DEPRESSED OR HOPELESS: NOT AT ALL
SUM OF ALL RESPONSES TO PHQ9 QUESTIONS 1 AND 2: 0
2. FEELING DOWN, DEPRESSED OR HOPELESS: NOT AT ALL
SUM OF ALL RESPONSES TO PHQ9 QUESTIONS 1 AND 2: 0
1. LITTLE INTEREST OR PLEASURE IN DOING THINGS: NOT AT ALL

## 2025-02-04 ASSESSMENT — COLUMBIA-SUICIDE SEVERITY RATING SCALE - C-SSRS
2. HAVE YOU ACTUALLY HAD ANY THOUGHTS OF KILLING YOURSELF?: NO
6. HAVE YOU EVER DONE ANYTHING, STARTED TO DO ANYTHING, OR PREPARED TO DO ANYTHING TO END YOUR LIFE?: NO
1. IN THE PAST MONTH, HAVE YOU WISHED YOU WERE DEAD OR WISHED YOU COULD GO TO SLEEP AND NOT WAKE UP?: NO

## 2025-02-04 ASSESSMENT — ACTIVITIES OF DAILY LIVING (ADL)
BATHING: INDEPENDENT
TAKING_MEDICATION: INDEPENDENT
DOING_HOUSEWORK: INDEPENDENT
DRESSING: INDEPENDENT
GROCERY_SHOPPING: INDEPENDENT
MANAGING_FINANCES: INDEPENDENT

## 2025-02-04 ASSESSMENT — PAIN SCALES - GENERAL: PAINLEVEL_OUTOF10: 0-NO PAIN

## 2025-02-04 NOTE — ASSESSMENT & PLAN NOTE
Orders:    Urinalysis with Reflex Microscopic; Future    Comprehensive Metabolic Panel; Future    CBC and Auto Differential; Future    TSH with reflex to Free T4 if abnormal; Future    Lipid Panel; Future    Hemoglobin A1C; Future

## 2025-02-04 NOTE — PROGRESS NOTES
"Subjective   Reason for Visit: Rainer Fajardo is an 64 y.o. male here for a Medicare Wellness visit.     Past Medical, Surgical, and Family History reviewed and updated in chart.    Reviewed all medications by prescribing practitioner or clinical pharmacist (such as prescriptions, OTCs, herbal therapies and supplements) and documented in the medical record.    HPI  Hypertension:          c/o Patient here for F/U. at goal, stable.          c/o Med compliance.          Denies : Med side effects.   Home bp 130's/70's  Asthma:          c/o Triggers with allergies.          Denies : Shortness of breath.          Denies : Cough.          Asthma F/U doing well and without complaints, at patient's baseline, stable off meds, uses proair prn.   Prostate Cancer:          c/o Difficulty voiding some leakage with urine, sees allyson,  salvage radiation due to rising psa , psa undectable,  sees radiation onc for psa checks          c/o Erectile dysfunction occ.          Denies : Pain.          Denies : Appetite change.          Denies : Fatigue.          Denies : Bone pain.   Diabetes Mellitus:          Insulin Resistance diagnosed with pre diabetes, follow fasting blood sugars periodically, counseled on weight loss and regular exercise.   Hyperlipidemia:          c/o Patient here for F/U. stable,  on rosuvastatin   Patient Care Team:  Ed Pressley MD as PCP - General (Family Medicine)  Miquel Sanabria MD as Radiation Oncologist (Radiation Oncology)     Review of Systems   Constitutional: Negative.    HENT: Negative.     Eyes: Negative.    Respiratory: Negative.     Gastrointestinal: Negative.    Endocrine: Negative.    Genitourinary: Negative.    Musculoskeletal: Negative.    Skin: Negative.    Allergic/Immunologic: Negative.    Neurological: Negative.    Psychiatric/Behavioral: Negative.         Objective   Vitals:  /81   Pulse 53   Temp 37.3 °C (99.1 °F) (Temporal)   Ht 1.753 m (5' 9\")   Wt 91.2 kg (201 " lb)   SpO2 98%   BMI 29.68 kg/m²       Physical Exam  Vitals reviewed.   Constitutional:       Appearance: Normal appearance.   HENT:      Right Ear: Tympanic membrane, ear canal and external ear normal.      Left Ear: Tympanic membrane, ear canal and external ear normal.      Nose: Nose normal.      Mouth/Throat:      Mouth: Mucous membranes are moist.   Eyes:      Extraocular Movements: Extraocular movements intact.      Conjunctiva/sclera: Conjunctivae normal.      Pupils: Pupils are equal, round, and reactive to light.   Cardiovascular:      Rate and Rhythm: Normal rate and regular rhythm.      Pulses: Normal pulses.      Heart sounds: Normal heart sounds.   Pulmonary:      Breath sounds: Normal breath sounds.   Abdominal:      General: Abdomen is flat. Bowel sounds are normal.      Palpations: Abdomen is soft.   Musculoskeletal:         General: Normal range of motion.      Cervical back: Neck supple.   Skin:     General: Skin is warm.   Neurological:      General: No focal deficit present.      Mental Status: He is alert and oriented to person, place, and time.      Cranial Nerves: Cranial nerves 2-12 are intact.   Psychiatric:         Attention and Perception: Attention normal.         Mood and Affect: Mood normal.         Assessment & Plan  Routine general medical examination at health care facility    Orders:    1 Year Follow Up In Primary Care - Wellness Exam    1 Year Follow Up In Primary Care - Wellness Exam; Future    Primary hypertension    Orders:    Urinalysis with Reflex Microscopic; Future    Comprehensive Metabolic Panel; Future    CBC and Auto Differential; Future    TSH with reflex to Free T4 if abnormal; Future    Lipid Panel; Future    Hemoglobin A1C; Future    Prediabetes    Orders:    Hemoglobin A1C; Future    Mild intermittent asthma without complication (HHS-HCC)    Orders:    albuterol (ProAir HFA) 90 mcg/actuation inhaler; Inhale 2 puffs every 4 hours if needed for wheezing or  shortness of breath.    Mixed hyperlipidemia         Carcinoma of prostate (Multi)

## 2025-02-07 ENCOUNTER — LAB (OUTPATIENT)
Dept: LAB | Facility: CLINIC | Age: 65
End: 2025-02-07
Payer: MEDICARE

## 2025-02-07 DIAGNOSIS — C61 CARCINOMA OF PROSTATE (MULTI): Chronic | ICD-10-CM

## 2025-02-07 PROCEDURE — 36415 COLL VENOUS BLD VENIPUNCTURE: CPT

## 2025-02-07 PROCEDURE — 84153 ASSAY OF PSA TOTAL: CPT

## 2025-02-08 LAB — PSA SERPL DL<=0.01 NG/ML-MCNC: 0.23 NG/ML (ref 0–4)

## 2025-02-13 LAB
ALBUMIN SERPL-MCNC: 4.8 G/DL (ref 3.6–5.1)
ALP SERPL-CCNC: 66 U/L (ref 35–144)
ALT SERPL-CCNC: 12 U/L (ref 9–46)
ANION GAP SERPL CALCULATED.4IONS-SCNC: 9 MMOL/L (CALC) (ref 7–17)
APPEARANCE UR: CLEAR
AST SERPL-CCNC: 17 U/L (ref 10–35)
BASOPHILS # BLD AUTO: 59 CELLS/UL (ref 0–200)
BASOPHILS NFR BLD AUTO: 1 %
BILIRUB SERPL-MCNC: 0.9 MG/DL (ref 0.2–1.2)
BILIRUB UR QL STRIP: NEGATIVE
BUN SERPL-MCNC: 18 MG/DL (ref 7–25)
CALCIUM SERPL-MCNC: 9.5 MG/DL (ref 8.6–10.3)
CHLORIDE SERPL-SCNC: 105 MMOL/L (ref 98–110)
CHOLEST SERPL-MCNC: 114 MG/DL
CHOLEST/HDLC SERPL: 2.9 (CALC)
CO2 SERPL-SCNC: 24 MMOL/L (ref 20–32)
COLOR UR: YELLOW
CREAT SERPL-MCNC: 0.97 MG/DL (ref 0.7–1.35)
EGFRCR SERPLBLD CKD-EPI 2021: 86 ML/MIN/1.73M2
EOSINOPHIL # BLD AUTO: 183 CELLS/UL (ref 15–500)
EOSINOPHIL NFR BLD AUTO: 3.1 %
ERYTHROCYTE [DISTWIDTH] IN BLOOD BY AUTOMATED COUNT: 11.7 % (ref 11–15)
EST. AVERAGE GLUCOSE BLD GHB EST-MCNC: 117 MG/DL
EST. AVERAGE GLUCOSE BLD GHB EST-SCNC: 6.5 MMOL/L
GLUCOSE SERPL-MCNC: 112 MG/DL (ref 65–99)
GLUCOSE UR QL STRIP: NEGATIVE
HBA1C MFR BLD: 5.7 % OF TOTAL HGB
HCT VFR BLD AUTO: 46.4 % (ref 38.5–50)
HDLC SERPL-MCNC: 40 MG/DL
HGB BLD-MCNC: 15.6 G/DL (ref 13.2–17.1)
HGB UR QL STRIP: NEGATIVE
KETONES UR QL STRIP: NEGATIVE
LDLC SERPL CALC-MCNC: 56 MG/DL (CALC)
LEUKOCYTE ESTERASE UR QL STRIP: NEGATIVE
LYMPHOCYTES # BLD AUTO: 1499 CELLS/UL (ref 850–3900)
LYMPHOCYTES NFR BLD AUTO: 25.4 %
MCH RBC QN AUTO: 32.1 PG (ref 27–33)
MCHC RBC AUTO-ENTMCNC: 33.6 G/DL (ref 32–36)
MCV RBC AUTO: 95.5 FL (ref 80–100)
MONOCYTES # BLD AUTO: 454 CELLS/UL (ref 200–950)
MONOCYTES NFR BLD AUTO: 7.7 %
NEUTROPHILS # BLD AUTO: 3705 CELLS/UL (ref 1500–7800)
NEUTROPHILS NFR BLD AUTO: 62.8 %
NITRITE UR QL STRIP: NEGATIVE
NONHDLC SERPL-MCNC: 74 MG/DL (CALC)
PH UR STRIP: NORMAL [PH] (ref 5–8)
PLATELET # BLD AUTO: 173 THOUSAND/UL (ref 140–400)
PMV BLD REES-ECKER: 12 FL (ref 7.5–12.5)
POTASSIUM SERPL-SCNC: 4.2 MMOL/L (ref 3.5–5.3)
PROT SERPL-MCNC: 7.1 G/DL (ref 6.1–8.1)
PROT UR QL STRIP: NEGATIVE
RBC # BLD AUTO: 4.86 MILLION/UL (ref 4.2–5.8)
SODIUM SERPL-SCNC: 138 MMOL/L (ref 135–146)
SP GR UR STRIP: 1.02 (ref 1–1.03)
TRIGL SERPL-MCNC: 98 MG/DL
TSH SERPL-ACNC: 1.82 MIU/L (ref 0.4–4.5)
WBC # BLD AUTO: 5.9 THOUSAND/UL (ref 3.8–10.8)

## 2025-03-06 DIAGNOSIS — E78.2 MIXED HYPERLIPIDEMIA: Chronic | ICD-10-CM

## 2025-03-06 DIAGNOSIS — I10 ESSENTIAL (PRIMARY) HYPERTENSION: ICD-10-CM

## 2025-03-06 RX ORDER — AMLODIPINE BESYLATE 10 MG/1
10 TABLET ORAL DAILY
Qty: 90 TABLET | Refills: 1 | Status: SHIPPED | OUTPATIENT
Start: 2025-03-06

## 2025-03-06 RX ORDER — ROSUVASTATIN CALCIUM 20 MG/1
20 TABLET, COATED ORAL DAILY
Qty: 90 TABLET | Refills: 3 | Status: SHIPPED | OUTPATIENT
Start: 2025-03-06

## 2025-07-10 ENCOUNTER — LAB (OUTPATIENT)
Dept: LAB | Facility: CLINIC | Age: 65
End: 2025-07-10
Payer: MEDICARE

## 2025-07-10 DIAGNOSIS — C61 CARCINOMA OF PROSTATE: Chronic | ICD-10-CM

## 2025-07-10 PROCEDURE — 84153 ASSAY OF PSA TOTAL: CPT

## 2025-07-10 PROCEDURE — 36415 COLL VENOUS BLD VENIPUNCTURE: CPT

## 2025-07-13 LAB — PSA SERPL DL<=0.01 NG/ML-MCNC: 0.29 NG/ML (ref 0–4)

## 2025-07-14 ENCOUNTER — HOSPITAL ENCOUNTER (OUTPATIENT)
Dept: RADIATION ONCOLOGY | Facility: CLINIC | Age: 65
Setting detail: RADIATION/ONCOLOGY SERIES
Discharge: HOME | End: 2025-07-14
Payer: MEDICARE

## 2025-07-14 VITALS
RESPIRATION RATE: 18 BRPM | SYSTOLIC BLOOD PRESSURE: 156 MMHG | BODY MASS INDEX: 29.27 KG/M2 | WEIGHT: 198.19 LBS | TEMPERATURE: 98.1 F | OXYGEN SATURATION: 97 % | HEART RATE: 57 BPM | DIASTOLIC BLOOD PRESSURE: 77 MMHG

## 2025-07-14 DIAGNOSIS — C61 CARCINOMA OF PROSTATE: Chronic | ICD-10-CM

## 2025-07-14 PROCEDURE — 99214 OFFICE O/P EST MOD 30 MIN: CPT | Performed by: RADIOLOGY

## 2025-07-14 PROCEDURE — 99215 OFFICE O/P EST HI 40 MIN: CPT | Performed by: RADIOLOGY

## 2025-07-14 ASSESSMENT — ENCOUNTER SYMPTOMS
EYES NEGATIVE: 1
PSYCHIATRIC NEGATIVE: 1
CONSTITUTIONAL NEGATIVE: 1
CARDIOVASCULAR NEGATIVE: 1
HEMATOLOGIC/LYMPHATIC NEGATIVE: 1
GASTROINTESTINAL NEGATIVE: 1
NEUROLOGICAL NEGATIVE: 1
FREQUENCY: 0
RESPIRATORY NEGATIVE: 1
DYSURIA: 0
HEMATURIA: 0
DEPRESSION: 0
LOSS OF SENSATION IN FEET: 0
DIFFICULTY URINATING: 0
ENDOCRINE NEGATIVE: 1
MUSCULOSKELETAL NEGATIVE: 1
OCCASIONAL FEELINGS OF UNSTEADINESS: 0

## 2025-07-14 ASSESSMENT — PATIENT HEALTH QUESTIONNAIRE - PHQ9
1. LITTLE INTEREST OR PLEASURE IN DOING THINGS: NOT AT ALL
SUM OF ALL RESPONSES TO PHQ9 QUESTIONS 1 AND 2: 0
2. FEELING DOWN, DEPRESSED OR HOPELESS: NOT AT ALL

## 2025-07-14 ASSESSMENT — PAIN SCALES - GENERAL: PAINLEVEL_OUTOF10: 0-NO PAIN

## 2025-07-14 NOTE — PROGRESS NOTES
Radiation Oncology Follow-Up    Patient Name:  Rainer Fajardo  MRN:  [id number]  :  1960    Referring Provider: Miquel Sanabria MD  Primary Care Provider: Ed Pressley MD  Care Team: Patient Care Team:  Ed Pressley MD as PCP - General (Family Medicine)  Miquel Sanabria MD as Radiation Oncologist (Radiation Oncology)    Date of Service: 2025    SUBJECTIVE  History of Present Illness:  Rainer Fajardo is a 65 y.o. male who was previously seen at the [facility] Department of Radiation Oncology for prostate ca.    DIAGNOSIS: Prostate ca. Initially Stage IIIC cT2 N0 M0 GG4 PSA 4.1. Dx 2018. Bone scan negative for mets. 2019 RP pT3b N0 M0 GG5 R0, undetectable PSA post op. PSA rising to 0.53 on 2021. IBF 2 years. Doubling time 1 year.  MRI showed a 9 mm R  int iliac LN (borderline enlarged), but otherwise normal. PSMA PET showed LISSETTE.     REGION TREATED: prostate bed, completed 2023     INTERVAL SINCE COMPLETION OF RADIATION: 2 years, 3 months.      TUMOR STATUS:   Rising PSA     CHIEF COMPLAINT:   Presents for follow-up recommendations regarding cancer listed above.      NARRATIVE  Since we last met, he has felt well but his PSA has been slowly increasing. July 10 2025 PSA 0.39. 2025 PSA 0.23. His current doubling time is ~ 1 year, though it cannot be reliably calculated given the low value of the PSA.     Lab Results   Component Value Date    PSA 0.13 2024    PSA <0.10 2024    PSA <0.10 2023         Review of Systems:   Review of Systems - Oncology    Performance Status:   The Karnofsky performance scale today is 100, Fully active, able to carry on all pre-disease performed without restriction (ECOG equivalent 0).   General: Negative for weight changes, night sweats, fever, recent trauma.   Eyes: Negative for blurriness, eye pain, blind spots.   Ears, nose, mouth, throat: Negative for changes in hearing, changes in  taste, sore throat, mouth sores.  Cardiovascular: Negative for palpitations, chest pain, tightness.   Pulmonary: Negative for shortness of breath, cough, sputum.   Gastrointestinal: Negative for indigestion, diarrhea, constipation, abdominal pain, blood in stool, nausea/vomiting.   Genitourinary: Per HPI.   Neuro: Negative for numbness, tingling, weakness, changes in speech.   Musculoskeletal: Negative for muscle pain, joint pain, edema, tenderness.   Endocrine: Negative for hot flashes.   Other: All others negative.      OBJECTIVE  Vital Signs:  /77   Pulse 57   Temp 36.7 °C (98.1 °F) (Skin)   Resp 18   Wt 89.9 kg (198 lb 3.1 oz)   SpO2 97%   BMI 29.27 kg/m²   Physical Exam   General: The patient appears well and is in no acute distress.   Cognition: Acceptable understanding of the essential elements of the medical situation.   Speech: Fluent and coherent.   Eyes: No conjunctival infection. Anicteric. Extraocular movements intact.   Head, ears, nose, mouth, throat: Atraumatic. Moist mucous membranes. Trachea midline.   Lymphatic: No visible cervical lymphadenopathy.   Cardiovascular: No visible jugular venous distention. Skin perfused.   Pulmonary: Breathes comfortably on room air without stridor or cough.   Abdomen: Nondistended.   Extremities: No edema or muscle wasting.   Neurologic: Cranial nerves II-XII are grossly intact. Moves all extremities. No gross focal deficits.   Psychiatric: Mood is appropriate.   Skin: No visible rash or ulcers.       ASSESSMENT:   Rainer Fajardo is a 65 y.o. male with prostate ca, sp salvage RT, now with rising PSA again.    PLAN:    I reviewed guidelines per NCCN.    Given his relatively slow PSA rise, and PSA < 0.5 (prior to RT), it would not make sense to check PSMA PET now, as it likely would not show disease and would not .    For now, I suggest repeating PSA q6 mos, and then get the scan once the DT is < ~9 mos, or PSA is > ~0.6-0.8, as it  should show disease by that time.    An alternative is to start ADT, but this is not curative, will have significant toxicity, may be lifelong, would obscure PSA and imaging, and would just delay time until castrate resistance.  He does not want ADT.    He is agreeable with the plan for PSA q6m.    Miquel Sanabria MD MS  Department of Radiation Oncology

## 2025-07-14 NOTE — PROGRESS NOTES
Radiation Oncology Nursing Note    IPSS (International Prostate Symptom Score):   5 / 35, bother 1  In the past month:   Incomplete Emptying - How often have you had the sensation of not emptying you bladder?   1 (less than 1 in 5 times)  Frequency - How often have you had to urinate less than every 2 hours?   1 (less than 1 in 5 times)  Intermittency - How often have you found you stopped and started again several times when you urinated?   1 (less than 1 in 5 times)  Urgency - How often have you found it difficult to postpone urination?   0 (not at all)  Weak stream - How often have you had a weak urinary stream?   0 (not at all)  Straining - How often have you had to strain to start urination?   0 (not at all)  Nocturia - How many times did you typically get up at night to urinate?   2 (2 times)  Quality of Life due to Urinary Symptoms  If you were to spend the rest of your life with your urinary condition just the way it is now, how would you feel about that?   1 (Pleased)     - He is experiencing urinary incontinence. Stress incontinence.   - He is not experiencing dysuria, hematuria, flank pain.     Sexual function:   - Quality of erections during the last 4 weeks: 3 = Firm enough for masturbation and foreplay only  - Use of erectile dysfunction medications:  None    Bowel function:    - Denies hematochezia or pain.     Current Systemic Treatment:  No     Pain: The patient's current pain level was assessed.  They report currently having a pain of 0 out of 10.  They feel their pain is under control without the use of pain medications.    Review of Systems:  Review of Systems   Constitutional: Negative.    HENT:  Negative.     Eyes: Negative.    Respiratory: Negative.     Cardiovascular: Negative.    Gastrointestinal: Negative.    Endocrine: Negative.    Genitourinary:  Positive for bladder incontinence. Negative for difficulty urinating, dyspareunia, dysuria, frequency, hematuria, nocturia, pelvic pain and  penile discharge.         Stress incontinence   Musculoskeletal: Negative.    Skin: Negative.    Neurological: Negative.    Hematological: Negative.    Psychiatric/Behavioral: Negative.        Rainer is here alone for follow up with Dr Sanabria. He reports he is doing well with only c/o stress incontinence. Meds and allergies reviewed an updated. MD made aware.  Education Documentation  Treatment Plan and Schedule, taught by Sandrine Coello RN at 7/14/2025  2:56 PM.  Learner: Patient  Readiness: Acceptance  Method: Explanation  Response: Verbalizes Understanding    Education Comments  No comments found.

## 2025-08-05 ENCOUNTER — OFFICE VISIT (OUTPATIENT)
Dept: PRIMARY CARE | Facility: CLINIC | Age: 65
End: 2025-08-05
Payer: MEDICARE

## 2025-08-05 VITALS
BODY MASS INDEX: 28.83 KG/M2 | WEIGHT: 195.2 LBS | HEART RATE: 50 BPM | SYSTOLIC BLOOD PRESSURE: 134 MMHG | TEMPERATURE: 97.8 F | DIASTOLIC BLOOD PRESSURE: 76 MMHG | OXYGEN SATURATION: 96 %

## 2025-08-05 DIAGNOSIS — E78.2 MIXED HYPERLIPIDEMIA: Chronic | ICD-10-CM

## 2025-08-05 DIAGNOSIS — I10 PRIMARY HYPERTENSION: Primary | Chronic | ICD-10-CM

## 2025-08-05 DIAGNOSIS — J45.30 MILD PERSISTENT ASTHMA WITHOUT COMPLICATION (HHS-HCC): ICD-10-CM

## 2025-08-05 DIAGNOSIS — R73.03 PREDIABETES: Chronic | ICD-10-CM

## 2025-08-05 PROCEDURE — 3078F DIAST BP <80 MM HG: CPT | Performed by: FAMILY MEDICINE

## 2025-08-05 PROCEDURE — 1160F RVW MEDS BY RX/DR IN RCRD: CPT | Performed by: FAMILY MEDICINE

## 2025-08-05 PROCEDURE — 1126F AMNT PAIN NOTED NONE PRSNT: CPT | Performed by: FAMILY MEDICINE

## 2025-08-05 PROCEDURE — 3075F SYST BP GE 130 - 139MM HG: CPT | Performed by: FAMILY MEDICINE

## 2025-08-05 PROCEDURE — 99214 OFFICE O/P EST MOD 30 MIN: CPT | Performed by: FAMILY MEDICINE

## 2025-08-05 PROCEDURE — G2211 COMPLEX E/M VISIT ADD ON: HCPCS | Performed by: FAMILY MEDICINE

## 2025-08-05 PROCEDURE — 1159F MED LIST DOCD IN RCRD: CPT | Performed by: FAMILY MEDICINE

## 2025-08-05 ASSESSMENT — COLUMBIA-SUICIDE SEVERITY RATING SCALE - C-SSRS
2. HAVE YOU ACTUALLY HAD ANY THOUGHTS OF KILLING YOURSELF?: NO
1. IN THE PAST MONTH, HAVE YOU WISHED YOU WERE DEAD OR WISHED YOU COULD GO TO SLEEP AND NOT WAKE UP?: NO
6. HAVE YOU EVER DONE ANYTHING, STARTED TO DO ANYTHING, OR PREPARED TO DO ANYTHING TO END YOUR LIFE?: NO

## 2025-08-05 ASSESSMENT — ENCOUNTER SYMPTOMS
LOSS OF SENSATION IN FEET: 0
OCCASIONAL FEELINGS OF UNSTEADINESS: 0
DEPRESSION: 0
ABDOMINAL DISTENTION: 0
COUGH: 0
HYPERTENSION: 1
SHORTNESS OF BREATH: 0
PALPITATIONS: 0

## 2025-08-05 ASSESSMENT — PAIN SCALES - GENERAL: PAINLEVEL_OUTOF10: 0-NO PAIN

## 2025-08-05 NOTE — PROGRESS NOTES
Subjective   Patient ID: Rainer Fajardo is a 65 y.o. male who presents for Hypertension.    Hypertension  Pertinent negatives include no chest pain, palpitations or shortness of breath.      Hypertension :   -Patient is here for follow-up hypertension: chronic , stable  -Tolerating medications without side effects  Dyslipidemia  -Patient presents for follow up chronic ,stable problem  -Concerns: none   -Treatment: Rosuvastatin 20 mg    History of prediabetes due for A1c  Asthma stable controlled with use of albuterol as needed  Review of Systems   Respiratory:  Negative for cough and shortness of breath.    Cardiovascular:  Negative for chest pain and palpitations.   Gastrointestinal:  Negative for abdominal distention.       Objective   /76   Pulse 50   Temp 36.6 °C (97.8 °F) (Temporal)   Wt 88.5 kg (195 lb 3.2 oz)   SpO2 96%   BMI 28.83 kg/m²     Physical Exam  Vitals reviewed.   Constitutional:       Appearance: Normal appearance.     Cardiovascular:      Rate and Rhythm: Normal rate and regular rhythm.      Heart sounds: Normal heart sounds. No murmur heard.  Pulmonary:      Breath sounds: Normal breath sounds.   Abdominal:      General: Abdomen is flat. Bowel sounds are normal.      Palpations: Abdomen is soft.     Musculoskeletal:         General: No swelling.     Neurological:      Mental Status: He is alert.         Assessment/Plan   Diagnoses and all orders for this visit:  Primary hypertension  -     Comprehensive Metabolic Panel; Future  -     Hemoglobin A1C; Future  Mixed hyperlipidemia  Prediabetes  -     Comprehensive Metabolic Panel; Future  -     Hemoglobin A1C; Future  Mild persistent asthma without complication ([facility])  Comments:  albuterol prn  Other orders  -     3 Month Follow Up In Primary Care    Rto 6mo  Cont meds

## 2025-08-15 LAB
ALBUMIN SERPL-MCNC: 4.8 G/DL (ref 3.6–5.1)
ALP SERPL-CCNC: 61 U/L (ref 35–144)
ALT SERPL-CCNC: 13 U/L (ref 9–46)
ANION GAP SERPL CALCULATED.4IONS-SCNC: 9 MMOL/L (CALC) (ref 7–17)
AST SERPL-CCNC: 19 U/L (ref 10–35)
BILIRUB SERPL-MCNC: 0.7 MG/DL (ref 0.2–1.2)
BUN SERPL-MCNC: 24 MG/DL (ref 7–25)
CALCIUM SERPL-MCNC: 9.4 MG/DL (ref 8.6–10.3)
CHLORIDE SERPL-SCNC: 107 MMOL/L (ref 98–110)
CO2 SERPL-SCNC: 23 MMOL/L (ref 20–32)
CREAT SERPL-MCNC: 0.93 MG/DL (ref 0.7–1.35)
EGFRCR SERPLBLD CKD-EPI 2021: 90 ML/MIN/1.73M2
EST. AVERAGE GLUCOSE BLD GHB EST-MCNC: 108 MG/DL
EST. AVERAGE GLUCOSE BLD GHB EST-SCNC: 6 MMOL/L
GLUCOSE SERPL-MCNC: 115 MG/DL (ref 65–99)
HBA1C MFR BLD: 5.4 %
POTASSIUM SERPL-SCNC: 4.2 MMOL/L (ref 3.5–5.3)
PROT SERPL-MCNC: 7.4 G/DL (ref 6.1–8.1)
SODIUM SERPL-SCNC: 139 MMOL/L (ref 135–146)

## 2025-09-03 DIAGNOSIS — I10 ESSENTIAL (PRIMARY) HYPERTENSION: ICD-10-CM

## 2025-09-03 RX ORDER — AMLODIPINE BESYLATE 10 MG/1
10 TABLET ORAL DAILY
Qty: 90 TABLET | Refills: 1 | Status: SHIPPED | OUTPATIENT
Start: 2025-09-03